# Patient Record
Sex: FEMALE | NOT HISPANIC OR LATINO | Employment: FULL TIME | URBAN - NONMETROPOLITAN AREA
[De-identification: names, ages, dates, MRNs, and addresses within clinical notes are randomized per-mention and may not be internally consistent; named-entity substitution may affect disease eponyms.]

---

## 2017-10-30 ENCOUNTER — TELEPHONE (OUTPATIENT)
Dept: ONCOLOGY | Facility: HOSPITAL | Age: 47
End: 2017-10-30

## 2017-10-30 NOTE — TELEPHONE ENCOUNTER
Patient seen Maci Sheridan NP at [facility] at [facility]--pt complains of loosing hair, Hgb 6.8.  Pt does not have family practice care established at this time.  No fatigue, no chest pain, no short of breath.  She is asking to refer this patient to you this week.   Joann Oviedo RN  October 30, 2017  2:43 PM

## 2017-10-30 NOTE — TELEPHONE ENCOUNTER
I will see her later this week.  If she starts having any chest pain or shortness of breath please ask her to come to the emergency room.  Thank you

## 2017-11-02 ENCOUNTER — CONSULT (OUTPATIENT)
Dept: ONCOLOGY | Facility: CLINIC | Age: 47
End: 2017-11-02

## 2017-11-02 ENCOUNTER — TELEPHONE (OUTPATIENT)
Dept: ONCOLOGY | Facility: CLINIC | Age: 47
End: 2017-11-02

## 2017-11-02 ENCOUNTER — APPOINTMENT (OUTPATIENT)
Dept: ONCOLOGY | Facility: HOSPITAL | Age: 47
End: 2017-11-02

## 2017-11-02 ENCOUNTER — LAB (OUTPATIENT)
Dept: ONCOLOGY | Facility: HOSPITAL | Age: 47
End: 2017-11-02

## 2017-11-02 VITALS
DIASTOLIC BLOOD PRESSURE: 75 MMHG | TEMPERATURE: 98.6 F | HEIGHT: 61 IN | BODY MASS INDEX: 28.05 KG/M2 | SYSTOLIC BLOOD PRESSURE: 145 MMHG | RESPIRATION RATE: 18 BRPM | HEART RATE: 89 BPM | WEIGHT: 148.59 LBS

## 2017-11-02 DIAGNOSIS — D50.9 MICROCYTIC ANEMIA: ICD-10-CM

## 2017-11-02 DIAGNOSIS — D75.839 THROMBOCYTOSIS: ICD-10-CM

## 2017-11-02 DIAGNOSIS — D50.9 MICROCYTIC ANEMIA: Primary | ICD-10-CM

## 2017-11-02 LAB
ANISOCYTOSIS BLD QL: NORMAL
BASOPHILS # BLD AUTO: 0.05 10*3/MM3 (ref 0–0.2)
BASOPHILS NFR BLD AUTO: 1 % (ref 0–2)
DEPRECATED RDW RBC AUTO: 38.3 FL (ref 36.4–46.3)
EOSINOPHIL # BLD AUTO: 0.04 10*3/MM3 (ref 0–0.7)
EOSINOPHIL NFR BLD AUTO: 0.8 % (ref 0–7)
ERYTHROCYTE [DISTWIDTH] IN BLOOD BY AUTOMATED COUNT: 17.9 % (ref 11.5–14.5)
FERRITIN SERPL-MCNC: 2.8 NG/ML (ref 6.2–137)
FOLATE SERPL-MCNC: 5.29 NG/ML (ref 2.76–21)
HCT VFR BLD AUTO: 27.8 % (ref 35–45)
HGB BLD-MCNC: 7.5 G/DL (ref 12–15.5)
IMM GRANULOCYTES # BLD: 0.01 10*3/MM3 (ref 0–0.02)
IMM GRANULOCYTES NFR BLD: 0.2 % (ref 0–0.5)
IRON 24H UR-MRATE: 11 MCG/DL (ref 37–170)
IRON SATN MFR SERPL: 2 % (ref 15–50)
LYMPHOCYTES # BLD AUTO: 1.78 10*3/MM3 (ref 0.6–4.2)
LYMPHOCYTES NFR BLD AUTO: 34 % (ref 10–50)
MCH RBC QN AUTO: 16.1 PG (ref 26.5–34)
MCHC RBC AUTO-ENTMCNC: 27 G/DL (ref 31.4–36)
MCV RBC AUTO: 59.5 FL (ref 80–98)
MICROCYTES BLD QL: NORMAL
MONOCYTES # BLD AUTO: 0.29 10*3/MM3 (ref 0–0.9)
MONOCYTES NFR BLD AUTO: 5.5 % (ref 0–12)
NEUTROPHILS # BLD AUTO: 3.07 10*3/MM3 (ref 2–8.6)
NEUTROPHILS NFR BLD AUTO: 58.5 % (ref 37–80)
NRBC BLD MANUAL-RTO: 0 /100 WBC (ref 0–0)
OVALOCYTES BLD QL SMEAR: NORMAL
PLATELET # BLD AUTO: 547 10*3/MM3 (ref 150–450)
PMV BLD AUTO: 8.5 FL (ref 8–12)
RBC # BLD AUTO: 4.67 10*6/MM3 (ref 3.77–5.16)
SMALL PLATELETS BLD QL SMEAR: NORMAL
TIBC SERPL-MCNC: 477 MCG/DL (ref 265–497)
VIT B12 BLD-MCNC: 516 PG/ML (ref 239–931)
WBC MORPH BLD: NORMAL
WBC NRBC COR # BLD: 5.24 10*3/MM3 (ref 3.2–9.8)

## 2017-11-02 PROCEDURE — G0463 HOSPITAL OUTPT CLINIC VISIT: HCPCS | Performed by: INTERNAL MEDICINE

## 2017-11-02 PROCEDURE — 85007 BL SMEAR W/DIFF WBC COUNT: CPT | Performed by: INTERNAL MEDICINE

## 2017-11-02 PROCEDURE — 85025 COMPLETE CBC W/AUTO DIFF WBC: CPT | Performed by: INTERNAL MEDICINE

## 2017-11-02 PROCEDURE — 82607 VITAMIN B-12: CPT | Performed by: INTERNAL MEDICINE

## 2017-11-02 PROCEDURE — 83550 IRON BINDING TEST: CPT | Performed by: INTERNAL MEDICINE

## 2017-11-02 PROCEDURE — 82728 ASSAY OF FERRITIN: CPT | Performed by: INTERNAL MEDICINE

## 2017-11-02 PROCEDURE — 82746 ASSAY OF FOLIC ACID SERUM: CPT | Performed by: INTERNAL MEDICINE

## 2017-11-02 PROCEDURE — 83540 ASSAY OF IRON: CPT | Performed by: INTERNAL MEDICINE

## 2017-11-02 PROCEDURE — 99203 OFFICE O/P NEW LOW 30 MIN: CPT | Performed by: INTERNAL MEDICINE

## 2017-11-02 RX ORDER — FERROUS SULFATE 325(65) MG
325 TABLET ORAL 2 TIMES DAILY WITH MEALS
Qty: 60 TABLET | Refills: 3 | Status: SHIPPED | OUTPATIENT
Start: 2017-11-02 | End: 2018-04-11 | Stop reason: SDUPTHER

## 2017-11-02 RX ORDER — LANOLIN ALCOHOL/MO/W.PET/CERES
1000 CREAM (GRAM) TOPICAL DAILY
Qty: 90 TABLET | Refills: 1 | Status: SHIPPED | OUTPATIENT
Start: 2017-11-02 | End: 2018-04-11 | Stop reason: SDUPTHER

## 2017-11-02 RX ORDER — DOCUSATE SODIUM 100 MG/1
100 CAPSULE, LIQUID FILLED ORAL 2 TIMES DAILY
Qty: 60 CAPSULE | Refills: 2 | Status: SHIPPED | OUTPATIENT
Start: 2017-11-02

## 2017-11-02 RX ORDER — LORATADINE 10 MG/1
10 TABLET ORAL
COMMUNITY
Start: 2017-10-30 | End: 2018-10-31

## 2017-11-02 RX ORDER — FOLIC ACID 1 MG/1
1 TABLET ORAL DAILY
Qty: 90 TABLET | Refills: 1 | Status: SHIPPED | OUTPATIENT
Start: 2017-11-02 | End: 2018-04-11 | Stop reason: SDUPTHER

## 2017-11-02 NOTE — PROGRESS NOTES
DATE OF CONSULT: 11/2/2017    REQUESTING SOURCE:        REASON FOR CONSULTATION:  Microcytic anemia      HISTORY OF PRESENT ILLNESS:    47-year-old female with a past medical history significant for arthritis affecting bilateral hand, was seen by STEPHANY Simpson at [facility] on October 30, 2017 with a complaint of hir fall.  Patient had a blood work done for evaluation and she was found to have microcytic anemia with MCV of 59 and hemoglobin of 6.8.  Patient complains of fatigue but denies any severe worsening recently.  Complains of heavy menstrual loss for last few months.  Denies any chest pain or any excessive shortness of breath with exertion.  States she had a infection in the right ear recently and had some dizziness with it.  Denies any recurrent dizziness since then.  Denies any blood in the stool or urine.  Denies any history of any blood transfusion or any family history of colon cancer.  Denies any chest pain or palpitation.      PAST MEDICAL HISTORY:    Past Medical History:   Diagnosis Date   • Anemia        PAST SURGICAL HISTORY:  No past surgical history on file.    ALLERGIES:    Allergies   Allergen Reactions   • Prednisone    • Sulfa Antibiotics        SOCIAL HISTORY:   Social History   Substance Use Topics   • Smoking status: Never Smoker   • Smokeless tobacco: Not on file   • Alcohol use Not on file       CURRENT MEDICATIONS:    Current Outpatient Prescriptions   Medication Sig Dispense Refill   • Cholecalciferol (VITAMIN D3) 5000 units capsule capsule Take 5,000 Units by mouth Daily.     • loratadine (CLARITIN) 10 MG tablet Take 10 mg by mouth.     • docusate sodium (COLACE) 100 MG capsule Take 1 capsule by mouth 2 (Two) Times a Day. 60 capsule 2   • ferrous sulfate 325 (65 FE) MG tablet Take 1 tablet by mouth 2 (Two) Times a Day With Meals. 60 tablet 3   • vitamin B-12 (CYANOCOBALAMIN) 1000 MCG tablet Take 1 tablet by mouth Daily. 90 tablet 1     No current facility-administered medications  for this visit.         HOME MEDICATIONS:   No current outpatient prescriptions on file prior to visit.     No current facility-administered medications on file prior to visit.        FAMILY HISTORY:    Family History   Problem Relation Age of Onset   • Heart disease Mother    • Heart disease Father    • Heart disease Sister    • Stroke Sister        REVIEW OF SYSTEMS:      CONSTITUTIONAL:  Complains of fatigue. Denies any fever, chills or weight loss.     HEENT:  No epistaxis, mouth sores or difficulty swallowing.    RESPIRATORY:  No new shortness of breath. No new cough or hemoptysis.    CARDIOVASCULAR:  No chest pain or palpitations.    GASTROINTESTINAL:  No abdominal pain nausea, vomiting or blood in the stool.    GENITOURINARY: No Dysuria or Hematuria.    MUSCULOSKELETAL: Complains of joint stiffness and arthritic pain in bilateral hand especially early in the morning.    LYMPHATICS:  Denies any abnormal swollen glands anywhere in the body.    NEUROLOGICAL :States she had dizziness with the year infection on right side which is resolved now.  No tingling or numbness. No headache . No seizures or balance problems.    SKIN: Complains of dry skin.        PHYSICAL EXAMINATION:      VITAL SIGNS:  Temp:  [98.6 °F (37 °C)] 98.6 °F (37 °C)  Heart Rate:  [89] 89  Resp:  [18] 18  BP: (145)/(75) 145/75    GENERAL:  Not in any distress.    HEENT:  Normocephalic, Atraumatic.Eyes  Shows mild pallor. No icterus. Extraocular Movements Intact. No Facial Asymmetry noted.    NECK:  No adenopathy. NO JVD.    RESPIRATORY:  Fair air entry bilateral. No rhonchi or wheezing.    CARDIOVASCULAR:  S1, S2. Regular rate and rhythm. No murmur or gallop appreciated.    ABDOMEN:  Soft, obese, nontender. Bowel sounds present in all four quadrants.  No organomegaly appreciated.    EXTREMITIES:  No edema.No Calf Tenderness.    NEUROLOGIC:  Alert, awake and oriented ×3.  No  Motor or sensory deficit appreciated. Cranial Nerves 2-12 grossly  intact.        DIAGNOSTIC DATA:    No results found for: WBC, RBC, HGB, HCT, MCV, MCH, MCHC, RDW, RDWSD, MPV, PLT, NEUTRORELPCT, LYMPHORELPCT, MONORELPCT, EOSRELPCT, BASORELPCT, AUTOIGPER, NEUTROABS, LYMPHSABS, MONOSABS, EOSABS, BASOSABS, AUTOIGNUM, NRBC  No results found for: GLUCOSE, NA, K, CO2, CL, ANIONGAP, CREATININE, BUN, BCR, CALCIUM, EGFRIFNONA, ALKPHOS, PROTEINTOT, ALT, AST, BILITOT, ALBUMIN, GLOB, LABIL2  No results found for: IRON, TIBC, LABIRON, FERRITIN, [id number], FOLATE  No results found for: , LABCA2, AFPTM, HCGQUANT, , CHROMGRNA, [id number], CEA, REFLABREPO]      ASSESSMENT AND PLAN:      1.  Microcytic anemia: Most likely secondary to wine deficiency from heavy menstrual loss.  We will check her stool for occult blood today.  We will check anemia workup today with iron studies, vitamin B12 and folate with repeat CBC today.  Patient denies any excessive symptoms from her anemia which may be because of she is having long-standing anemia that we do not know at this point.  Her last blood work was done many years ago.  Her hemoglobin was 6.8 on October 30, 2017 with MCV of 59 and RDW of 18.  We will recommend starting ferrous sulfate 1 tablet by mouth every day for next 7 days and after that if she does not have any severe stomach upset or constipation we'll increase it to one tablet twice daily.  If patient develops any stomach upset or cannot tolerate iron and then will do intravenous Feraheme which was discussed with patient.  Her vitamin B12 level was only 400 on October 30, recommend starting vitamin B12 thousand micrograms by mouth daily.  If her fecal occult blood comes back positive she will need to be seen by gastroenterology which was also discussed with patient.  Patient was instructed to call us if her fatigue gets any worse in that case we will do one unit of PRBC.    2.  Thrombocytosis: Most likely reactive secondary to iron deficiency.  We will monitored with CBC for  now.    3.  Health maintenance: Patient is not a smoker.  She is 47 years of age and never had a mammogram done, importance of getting screening mammograms done every year after age 40 was discussed with patient and her .  She is not due for screening colonoscopy at this point unless her stool comes back positive for blood that.  Checking today.  She remains full code.      Thank you for this consultation.          Abdias Fishman MD  11/2/2017  9:26 AM          EMR Dragon/Transcription disclaimer:   Much of this encounter note is an electronic transcription/translation of spoken language to printed text. The electronic translation of spoken language may permit erroneous, or at times, nonsensical words or phrases to be inadvertently transcribed; Although I have reviewed the note for such errors, some may still exist.

## 2017-11-02 NOTE — TELEPHONE ENCOUNTER
Called patient back and informed of lab results. Dr. Fishman also looked at cbc and said for patient to just take iron tablet and that no transfusion was needed at this time. Patient informed of this and she states understanding.

## 2017-11-03 ENCOUNTER — TELEPHONE (OUTPATIENT)
Dept: ONCOLOGY | Facility: HOSPITAL | Age: 47
End: 2017-11-03

## 2017-11-03 NOTE — TELEPHONE ENCOUNTER
----- Message from Abdias Fishman MD sent at 11/2/2017  4:23 PM CDT -----  I have sent prescription for folic acid 1 mg by mouth daily to her pharmacy.  Please let patient know.  Thank you

## 2017-11-03 NOTE — TELEPHONE ENCOUNTER
Called patient and informed that prescription for Folic Acid sent to her pharmacy. Patient states understanding.

## 2017-11-13 LAB — HEMOCCULT STL QL IA: NEGATIVE

## 2017-11-13 PROCEDURE — 82274 ASSAY TEST FOR BLOOD FECAL: CPT | Performed by: INTERNAL MEDICINE

## 2018-01-02 ENCOUNTER — LAB (OUTPATIENT)
Dept: ONCOLOGY | Facility: HOSPITAL | Age: 48
End: 2018-01-02

## 2018-01-02 DIAGNOSIS — D50.9 MICROCYTIC ANEMIA: ICD-10-CM

## 2018-01-02 LAB
ALBUMIN SERPL-MCNC: 4.4 G/DL (ref 3.4–4.8)
ALBUMIN/GLOB SERPL: 1.1 G/DL (ref 1.1–1.8)
ALP SERPL-CCNC: 80 U/L (ref 38–126)
ALT SERPL W P-5'-P-CCNC: 35 U/L (ref 9–52)
ANION GAP SERPL CALCULATED.3IONS-SCNC: 10 MMOL/L (ref 5–15)
ANISOCYTOSIS BLD QL: NORMAL
AST SERPL-CCNC: 26 U/L (ref 14–36)
BASOPHILS # BLD AUTO: 0.03 10*3/MM3 (ref 0–0.2)
BASOPHILS NFR BLD AUTO: 0.3 % (ref 0–2)
BILIRUB SERPL-MCNC: 0.2 MG/DL (ref 0.2–1.3)
BUN BLD-MCNC: 8 MG/DL (ref 7–21)
BUN/CREAT SERPL: 13.8 (ref 7–25)
CALCIUM SPEC-SCNC: 9 MG/DL (ref 8.4–10.2)
CHLORIDE SERPL-SCNC: 101 MMOL/L (ref 95–110)
CO2 SERPL-SCNC: 25 MMOL/L (ref 22–31)
CREAT BLD-MCNC: 0.58 MG/DL (ref 0.5–1)
DEPRECATED RDW RBC AUTO: ABNORMAL FL (ref 36.4–46.3)
EOSINOPHIL # BLD AUTO: 0.1 10*3/MM3 (ref 0–0.7)
EOSINOPHIL NFR BLD AUTO: 1.1 % (ref 0–7)
ERYTHROCYTE [DISTWIDTH] IN BLOOD BY AUTOMATED COUNT: ABNORMAL % (ref 11.5–14.5)
FERRITIN SERPL-MCNC: 16.7 NG/ML (ref 6.2–137)
FOLATE SERPL-MCNC: >20 NG/ML (ref 2.76–21)
GFR SERPL CREATININE-BSD FRML MDRD: 111 ML/MIN/1.73 (ref 58–135)
GFR SERPL CREATININE-BSD FRML MDRD: 135 ML/MIN/1.73 (ref 58–135)
GLOBULIN UR ELPH-MCNC: 4 GM/DL (ref 2.3–3.5)
GLUCOSE BLD-MCNC: 91 MG/DL (ref 60–100)
HCT VFR BLD AUTO: 40.9 % (ref 35–45)
HGB BLD-MCNC: 13.6 G/DL (ref 12–15.5)
HYPOCHROMIA BLD QL: NORMAL
IMM GRANULOCYTES # BLD: 0.01 10*3/MM3 (ref 0–0.02)
IMM GRANULOCYTES NFR BLD: 0.1 % (ref 0–0.5)
IRON 24H UR-MRATE: 162 MCG/DL (ref 37–170)
IRON SATN MFR SERPL: 46 % (ref 15–50)
LARGE PLATELETS: NORMAL
LYMPHOCYTES # BLD AUTO: 2.4 10*3/MM3 (ref 0.6–4.2)
LYMPHOCYTES NFR BLD AUTO: 27.3 % (ref 10–50)
MCH RBC QN AUTO: 25.8 PG (ref 26.5–34)
MCHC RBC AUTO-ENTMCNC: 33.3 G/DL (ref 31.4–36)
MCV RBC AUTO: 77.6 FL (ref 80–98)
MICROCYTES BLD QL: NORMAL
MONOCYTES # BLD AUTO: 0.55 10*3/MM3 (ref 0–0.9)
MONOCYTES NFR BLD AUTO: 6.3 % (ref 0–12)
NEUTROPHILS # BLD AUTO: 5.7 10*3/MM3 (ref 2–8.6)
NEUTROPHILS NFR BLD AUTO: 64.9 % (ref 37–80)
NRBC BLD MANUAL-RTO: 0 /100 WBC (ref 0–0)
OVALOCYTES BLD QL SMEAR: NORMAL
PLATELET # BLD AUTO: 289 10*3/MM3 (ref 150–450)
PMV BLD AUTO: 9.2 FL (ref 8–12)
POTASSIUM BLD-SCNC: 4 MMOL/L (ref 3.5–5.1)
PROT SERPL-MCNC: 8.4 G/DL (ref 6.3–8.6)
RBC # BLD AUTO: 5.27 10*6/MM3 (ref 3.77–5.16)
SMALL PLATELETS BLD QL SMEAR: ADEQUATE
SODIUM BLD-SCNC: 136 MMOL/L (ref 137–145)
TARGETS BLD QL SMEAR: NORMAL
TIBC SERPL-MCNC: 355 MCG/DL (ref 265–497)
VIT B12 BLD-MCNC: 969 PG/ML (ref 239–931)
WBC MORPH BLD: NORMAL
WBC NRBC COR # BLD: 8.79 10*3/MM3 (ref 3.2–9.8)

## 2018-01-02 PROCEDURE — 82746 ASSAY OF FOLIC ACID SERUM: CPT

## 2018-01-02 PROCEDURE — 85007 BL SMEAR W/DIFF WBC COUNT: CPT

## 2018-01-02 PROCEDURE — 85025 COMPLETE CBC W/AUTO DIFF WBC: CPT

## 2018-01-02 PROCEDURE — 82607 VITAMIN B-12: CPT

## 2018-01-02 PROCEDURE — 36415 COLL VENOUS BLD VENIPUNCTURE: CPT | Performed by: NURSE PRACTITIONER

## 2018-01-02 PROCEDURE — 82728 ASSAY OF FERRITIN: CPT

## 2018-01-02 PROCEDURE — 83540 ASSAY OF IRON: CPT

## 2018-01-02 PROCEDURE — 83550 IRON BINDING TEST: CPT

## 2018-01-02 PROCEDURE — 80053 COMPREHEN METABOLIC PANEL: CPT

## 2018-01-03 ENCOUNTER — OFFICE VISIT (OUTPATIENT)
Dept: ONCOLOGY | Facility: CLINIC | Age: 48
End: 2018-01-03

## 2018-01-03 VITALS
HEART RATE: 90 BPM | BODY MASS INDEX: 30.01 KG/M2 | RESPIRATION RATE: 18 BRPM | WEIGHT: 158.95 LBS | SYSTOLIC BLOOD PRESSURE: 141 MMHG | TEMPERATURE: 98.3 F | HEIGHT: 61 IN | DIASTOLIC BLOOD PRESSURE: 89 MMHG

## 2018-01-03 DIAGNOSIS — D50.0 IRON DEFICIENCY ANEMIA DUE TO CHRONIC BLOOD LOSS: Primary | ICD-10-CM

## 2018-01-03 PROCEDURE — 99214 OFFICE O/P EST MOD 30 MIN: CPT | Performed by: NURSE PRACTITIONER

## 2018-01-03 PROCEDURE — G0463 HOSPITAL OUTPT CLINIC VISIT: HCPCS | Performed by: NURSE PRACTITIONER

## 2018-01-03 NOTE — PROGRESS NOTES
DATE OF VISIT: 1/3/2018    REASON FOR VISIT:  Follow-up for iron deficiency anemia    HISTORY OF PRESENT ILLNESS:  47-year-old female with a past medical history significant for arthritis affecting bilateral hand, was seen by STEPHANY Simpson at [facility] on October 30, 2017 with a complaint of hir fall.  Patient had a blood work done for evaluation and she was found to have microcytic anemia with MCV of 59 and hemoglobin of 6.8.  Patient complains of fatigue but denies any severe worsening recently.  Complains of heavy menstrual loss for last few months.  Denies any chest pain or any excessive shortness of breath with exertion.   Denies any blood in the stool or urine.  Denies any history of any blood transfusion or any family history of colon cancer.  Denies any chest pain or palpitation.    She was seen by Dr. Fishman and found to be severe iron deficient, along with low B-12 and folate level. She was started on supplementation and is back today to reassess her iron levels. She is taking ferrous sulfate BID and tolerating very well.        PAST MEDICAL HISTORY:    Past Medical History:   Diagnosis Date   • Anemia        SOCIAL HISTORY:    Social History   Substance Use Topics   • Smoking status: Never Smoker   • Smokeless tobacco: None   • Alcohol use None       Surgical History :  No past surgical history on file.    ALLERGIES:    Allergies   Allergen Reactions   • Prednisone    • Sulfa Antibiotics        REVIEW OF SYSTEMS:      CONSTITUTIONAL:  No fever, chills, or night sweats.     HEENT:  No epistaxis, mouth sores, or difficulty swallowing.    RESPIRATORY:  No new shortness of breath or cough at present.    CARDIOVASCULAR:  No chest pain or palpitations.    GASTROINTESTINAL:  No abdominal pain, nausea, vomiting, or blood in the stool.    GENITOURINARY:  No dysuria or hematuria.    MUSCULOSKELETAL:  No any new back pain or arthralgias.     NEUROLOGICAL:  No tingling or numbness. No new headache or dizziness.  "    LYMPHATICS:  Denies any abnormal swollen and anywhere in the body.    SKIN:  Denies any new skin rash.    PHYSICAL EXAMINATION:      VITAL SIGNS:  /89  Pulse 90  Temp 98.3 °F (36.8 °C) (Temporal Artery )   Resp 18  Ht 154.9 cm (60.98\")  Wt 72.1 kg (158 lb 15.2 oz)  BMI 30.05 kg/m2    GENERAL:  Not in any distress.    HEENT:  Normocephalic, Atraumatic.Mild Conjunctival pallor. No icterus.  No Facial Asymmetry noted.    NECK:  No adenopathy. No JVD.    RESPIRATORY:  Fair air entry bilateral. No rhonchi or wheezing.    CARDIOVASCULAR:  S1, S2. Regular rate and rhythm. No murmur or gallop appreciated.    ABDOMEN:  Soft, obese, nontender. Bowel sounds present in all four quadrants.  No organomegaly appreciated.    EXTREMITIES:  No edema.No Calf Tenderness.    NEUROLOGIC:  Alert, awake and oriented ×3.    SKIN : No new skin lesion identified  DIAGNOSTIC DATA:    Glucose   Date Value Ref Range Status   01/02/2018 91 60 - 100 mg/dL Final     Sodium   Date Value Ref Range Status   01/02/2018 136 (L) 137 - 145 mmol/L Final     Potassium   Date Value Ref Range Status   01/02/2018 4.0 3.5 - 5.1 mmol/L Final     CO2   Date Value Ref Range Status   01/02/2018 25.0 22.0 - 31.0 mmol/L Final     Chloride   Date Value Ref Range Status   01/02/2018 101 95 - 110 mmol/L Final     Anion Gap   Date Value Ref Range Status   01/02/2018 10.0 5.0 - 15.0 mmol/L Final     Creatinine   Date Value Ref Range Status   01/02/2018 0.58 0.50 - 1.00 mg/dL Final     BUN   Date Value Ref Range Status   01/02/2018 8 7 - 21 mg/dL Final     BUN/Creatinine Ratio   Date Value Ref Range Status   01/02/2018 13.8 7.0 - 25.0 Final     Calcium   Date Value Ref Range Status   01/02/2018 9.0 8.4 - 10.2 mg/dL Final     eGFR Non  Amer   Date Value Ref Range Status   01/02/2018 111 58 - 135 mL/min/1.73 Final     Alkaline Phosphatase   Date Value Ref Range Status   01/02/2018 80 38 - 126 U/L Final     Total Protein   Date Value Ref Range Status "   01/02/2018 8.4 6.3 - 8.6 g/dL Final     ALT (SGPT)   Date Value Ref Range Status   01/02/2018 35 9 - 52 U/L Final     AST (SGOT)   Date Value Ref Range Status   01/02/2018 26 14 - 36 U/L Final     Total Bilirubin   Date Value Ref Range Status   01/02/2018 0.2 0.2 - 1.3 mg/dL Final     Albumin   Date Value Ref Range Status   01/02/2018 4.40 3.40 - 4.80 g/dL Final     Globulin   Date Value Ref Range Status   01/02/2018 4.0 (H) 2.3 - 3.5 gm/dL Final     A/G Ratio   Date Value Ref Range Status   01/02/2018 1.1 1.1 - 1.8 g/dL Final     Lab Results   Component Value Date    WBC 8.79 01/02/2018    HGB 13.6 01/02/2018    HCT 40.9 01/02/2018    MCV 77.6 (L) 01/02/2018     01/02/2018     Lab Results   Component Value Date    NEUTROABS 5.70 01/02/2018    IRON 162 01/02/2018    TIBC 355 01/02/2018    LABIRON 46 01/02/2018    FERRITIN 16.70 01/02/2018    [id number] 969 (H) 01/02/2018    FOLATE >20.00 01/02/2018     No results found for: , LABCA2, AFPTM, HCGQUANT, , CHROMGRNA, [id number], CEA, REFLABREPO]        ASSESSMENT AND PLAN:      1. Iron deficiency anemia secondary to heavy menstrual blood loss; her Hgb has improved to 13.6 and her iron profile and ferritin are improving as well, will have her continue taking ferrous sulfate BID since she is tolerating well and will recheck her labs again in 3 mos.    2. Thrombocytosis, thought to be secondary to problem #1 has resolved.    3. Health maintenance, she does not smoke and at age 47 she is not due for screening colonoscopy.       This document has been signed by STEPHANY Louis on January 3, 2018 3:58 PM

## 2018-04-04 ENCOUNTER — APPOINTMENT (OUTPATIENT)
Dept: ONCOLOGY | Facility: HOSPITAL | Age: 48
End: 2018-04-04

## 2018-04-10 ENCOUNTER — LAB (OUTPATIENT)
Dept: LAB | Facility: CLINIC | Age: 48
End: 2018-04-10

## 2018-04-10 ENCOUNTER — APPOINTMENT (OUTPATIENT)
Dept: ONCOLOGY | Facility: HOSPITAL | Age: 48
End: 2018-04-10

## 2018-04-10 DIAGNOSIS — D50.0 IRON DEFICIENCY ANEMIA DUE TO CHRONIC BLOOD LOSS: ICD-10-CM

## 2018-04-11 ENCOUNTER — OFFICE VISIT (OUTPATIENT)
Dept: ONCOLOGY | Facility: CLINIC | Age: 48
End: 2018-04-11

## 2018-04-11 ENCOUNTER — TELEPHONE (OUTPATIENT)
Dept: ONCOLOGY | Facility: HOSPITAL | Age: 48
End: 2018-04-11

## 2018-04-11 VITALS
TEMPERATURE: 98.4 F | SYSTOLIC BLOOD PRESSURE: 154 MMHG | BODY MASS INDEX: 31.75 KG/M2 | HEIGHT: 61 IN | HEART RATE: 84 BPM | WEIGHT: 168.2 LBS | RESPIRATION RATE: 18 BRPM | DIASTOLIC BLOOD PRESSURE: 82 MMHG

## 2018-04-11 DIAGNOSIS — D50.0 IRON DEFICIENCY ANEMIA DUE TO CHRONIC BLOOD LOSS: Primary | ICD-10-CM

## 2018-04-11 LAB
BASOPHILS # BLD AUTO: 0.05 10*3/MM3 (ref 0–0.2)
BASOPHILS NFR BLD AUTO: 0.7 % (ref 0–2)
DEPRECATED RDW RBC AUTO: 42.1 FL (ref 36.4–46.3)
EOSINOPHIL # BLD AUTO: 0.16 10*3/MM3 (ref 0–0.7)
EOSINOPHIL NFR BLD AUTO: 2.1 % (ref 0–7)
ERYTHROCYTE [DISTWIDTH] IN BLOOD BY AUTOMATED COUNT: 13 % (ref 11.5–14.5)
FERRITIN SERPL-MCNC: 46.8 NG/ML (ref 6.2–137)
FOLATE SERPL-MCNC: >20 NG/ML (ref 2.76–21)
HCT VFR BLD AUTO: 41.1 % (ref 35–45)
HGB BLD-MCNC: 14.1 G/DL (ref 12–15.5)
IMM GRANULOCYTES # BLD: 0.02 10*3/MM3 (ref 0–0.02)
IMM GRANULOCYTES NFR BLD: 0.3 % (ref 0–0.5)
IRON 24H UR-MRATE: 134 MCG/DL (ref 37–170)
IRON SATN MFR SERPL: 43 % (ref 15–50)
LYMPHOCYTES # BLD AUTO: 2.33 10*3/MM3 (ref 0.6–4.2)
LYMPHOCYTES NFR BLD AUTO: 30.8 % (ref 10–50)
MCH RBC QN AUTO: 30.6 PG (ref 26.5–34)
MCHC RBC AUTO-ENTMCNC: 34.3 G/DL (ref 31.4–36)
MCV RBC AUTO: 89.2 FL (ref 80–98)
MONOCYTES # BLD AUTO: 0.49 10*3/MM3 (ref 0–0.9)
MONOCYTES NFR BLD AUTO: 6.5 % (ref 0–12)
NEUTROPHILS # BLD AUTO: 4.52 10*3/MM3 (ref 2–8.6)
NEUTROPHILS NFR BLD AUTO: 59.6 % (ref 37–80)
PLATELET # BLD AUTO: 298 10*3/MM3 (ref 150–450)
PMV BLD AUTO: 11.7 FL (ref 8–12)
RBC # BLD AUTO: 4.61 10*6/MM3 (ref 3.77–5.16)
TIBC SERPL-MCNC: 315 MCG/DL (ref 265–497)
VIT B12 BLD-MCNC: 904 PG/ML (ref 239–931)
WBC NRBC COR # BLD: 7.57 10*3/MM3 (ref 3.2–9.8)

## 2018-04-11 PROCEDURE — 82746 ASSAY OF FOLIC ACID SERUM: CPT | Performed by: NURSE PRACTITIONER

## 2018-04-11 PROCEDURE — 83540 ASSAY OF IRON: CPT | Performed by: NURSE PRACTITIONER

## 2018-04-11 PROCEDURE — 82607 VITAMIN B-12: CPT | Performed by: NURSE PRACTITIONER

## 2018-04-11 PROCEDURE — G0463 HOSPITAL OUTPT CLINIC VISIT: HCPCS | Performed by: NURSE PRACTITIONER

## 2018-04-11 PROCEDURE — 99214 OFFICE O/P EST MOD 30 MIN: CPT | Performed by: NURSE PRACTITIONER

## 2018-04-11 PROCEDURE — 83550 IRON BINDING TEST: CPT | Performed by: NURSE PRACTITIONER

## 2018-04-11 PROCEDURE — 82728 ASSAY OF FERRITIN: CPT | Performed by: NURSE PRACTITIONER

## 2018-04-11 PROCEDURE — 85025 COMPLETE CBC W/AUTO DIFF WBC: CPT | Performed by: NURSE PRACTITIONER

## 2018-04-11 PROCEDURE — 36415 COLL VENOUS BLD VENIPUNCTURE: CPT | Performed by: FAMILY MEDICINE

## 2018-04-11 RX ORDER — FERROUS SULFATE 325(65) MG
325 TABLET ORAL 2 TIMES DAILY WITH MEALS
Qty: 60 TABLET | Refills: 3 | Status: SHIPPED | OUTPATIENT
Start: 2018-04-11

## 2018-04-11 RX ORDER — LANOLIN ALCOHOL/MO/W.PET/CERES
1000 CREAM (GRAM) TOPICAL DAILY
Qty: 90 TABLET | Refills: 1 | Status: SHIPPED | OUTPATIENT
Start: 2018-04-11

## 2018-04-11 RX ORDER — FOLIC ACID 1 MG/1
1 TABLET ORAL DAILY
Qty: 90 TABLET | Refills: 1 | Status: SHIPPED | OUTPATIENT
Start: 2018-04-11 | End: 2018-09-13 | Stop reason: SDUPTHER

## 2018-04-11 NOTE — PROGRESS NOTES
"DATE OF VISIT: 4/11/2018    REASON FOR VISIT:   Follow-up for iron deficiency anemia     HISTORY OF PRESENT ILLNESS:  48-year-old female with a past medical history significant for arthritis affecting bilateral hand, was seen by STEPHANY Simpson at [facility] on October 30, 2017 and was found to have a severe iron deficiency anemia, Hgb wa 6.8 with MCV of 59 and ferritin was 2.8. She was seen here in consultation and was noted to have heavy menstrual periods. B-12 and folate levels were low as well. She was started on oral iron BID along with oral B-12 and folic acid. Her labs in January were improving. She is here today for follow-up appt.      PAST MEDICAL HISTORY:    Past Medical History:   Diagnosis Date   • Anemia        SOCIAL HISTORY:    Social History   Substance Use Topics   • Smoking status: Never Smoker   • Smokeless tobacco: Not on file   • Alcohol use Not on file       Surgical History :  No past surgical history on file.    ALLERGIES:    Allergies   Allergen Reactions   • Prednisone    • Sulfa Antibiotics        REVIEW OF SYSTEMS:      CONSTITUTIONAL:  No fever, chills, or night sweats.     HEENT:  No epistaxis, mouth sores, or difficulty swallowing.    RESPIRATORY:  No new shortness of breath or cough at present.    CARDIOVASCULAR:  No chest pain or palpitations.    GASTROINTESTINAL:  No abdominal pain, nausea, vomiting, or blood in the stool.    GENITOURINARY:  No dysuria or hematuria.    MUSCULOSKELETAL:  No any new back pain or arthralgias.     NEUROLOGICAL:  No tingling or numbness. No new headache or dizziness.     LYMPHATICS:  Denies any abnormal swollen and anywhere in the body.    SKIN:  Denies any new skin rash.    PHYSICAL EXAMINATION:      VITAL SIGNS:  /82   Pulse 84   Temp 98.4 °F (36.9 °C) (Temporal Artery )   Resp 18   Ht 154.9 cm (60.98\")   Wt 76.3 kg (168 lb 3.2 oz)   BMI 31.80 kg/m²     GENERAL:  Not in any distress.    HEENT:  Normocephalic, Atraumatic.Mild Conjunctival " pallor. No icterus. No Facial Asymmetry noted.    NECK:  No adenopathy. No JVD.    RESPIRATORY:  Fair air entry bilateral. No rhonchi or wheezing.    CARDIOVASCULAR:  S1, S2. Regular rate and rhythm. No murmur or gallop appreciated.    ABDOMEN:  Soft, obese, nontender. Bowel sounds present in all four quadrants.  No organomegaly appreciated.    EXTREMITIES:  No edema.No Calf Tenderness.    NEUROLOGIC:  Alert, awake and oriented ×3.      SKIN : No new skin lesion identified  DIAGNOSTIC DATA:    Glucose   Date Value Ref Range Status   01/02/2018 91 60 - 100 mg/dL Final     Sodium   Date Value Ref Range Status   01/02/2018 136 (L) 137 - 145 mmol/L Final     Potassium   Date Value Ref Range Status   01/02/2018 4.0 3.5 - 5.1 mmol/L Final     CO2   Date Value Ref Range Status   01/02/2018 25.0 22.0 - 31.0 mmol/L Final     Chloride   Date Value Ref Range Status   01/02/2018 101 95 - 110 mmol/L Final     Anion Gap   Date Value Ref Range Status   01/02/2018 10.0 5.0 - 15.0 mmol/L Final     Creatinine   Date Value Ref Range Status   01/02/2018 0.58 0.50 - 1.00 mg/dL Final     BUN   Date Value Ref Range Status   01/02/2018 8 7 - 21 mg/dL Final     BUN/Creatinine Ratio   Date Value Ref Range Status   01/02/2018 13.8 7.0 - 25.0 Final     Calcium   Date Value Ref Range Status   01/02/2018 9.0 8.4 - 10.2 mg/dL Final     eGFR Non  Amer   Date Value Ref Range Status   01/02/2018 111 58 - 135 mL/min/1.73 Final     Alkaline Phosphatase   Date Value Ref Range Status   01/02/2018 80 38 - 126 U/L Final     Total Protein   Date Value Ref Range Status   01/02/2018 8.4 6.3 - 8.6 g/dL Final     ALT (SGPT)   Date Value Ref Range Status   01/02/2018 35 9 - 52 U/L Final     AST (SGOT)   Date Value Ref Range Status   01/02/2018 26 14 - 36 U/L Final     Total Bilirubin   Date Value Ref Range Status   01/02/2018 0.2 0.2 - 1.3 mg/dL Final     Albumin   Date Value Ref Range Status   01/02/2018 4.40 3.40 - 4.80 g/dL Final     Globulin   Date  Value Ref Range Status   01/02/2018 4.0 (H) 2.3 - 3.5 gm/dL Final     A/G Ratio   Date Value Ref Range Status   01/02/2018 1.1 1.1 - 1.8 g/dL Final     Lab Results   Component Value Date    WBC 7.57 04/11/2018    HGB 14.1 04/11/2018    HCT 41.1 04/11/2018    MCV 89.2 04/11/2018     04/11/2018     Lab Results   Component Value Date    NEUTROABS 4.52 04/11/2018    IRON 134 04/11/2018    TIBC 315 04/11/2018    LABIRON 43 04/11/2018    FERRITIN 16.70 01/02/2018    [id number] 969 (H) 01/02/2018    FOLATE >20.00 01/02/2018     No results found for: , LABCA2, AFPTM, HCGQUANT, , CHROMGRNA, [id number], CEA, REFLABREPO]    ASSESSMENT AND PLAN:      1. Iron deficiency anemia secondary to heavy menstrual blood loss; her Hgb has improved to 14.1; iron studies are still pending along with B-12 and folate levels; will have her continue her medications and once labs are back and reviewed if any changes are neccesary will contact pt; new prescriptions sent to her pharmacy. Will recheck her labs again in 4 mos.     2. Thrombocytosis, thought to be secondary to problem #1 has resolved.     3. Health maintenance, she does not smoke and at age 47 she is not due for screening colonoscopy. She has not had a PAP smear or mammogram, recommended that she go back and see Maci Sheridan NP at [facility] and discuss having these screening procedures ordered.       This document has been signed by STEPHANY Louis on April 11, 2018 2:30 PM

## 2018-04-11 NOTE — TELEPHONE ENCOUNTER
----- Message from STEPHANY Louis sent at 4/11/2018  3:32 PM CDT -----  Please call patient and tell her to cut back to one iron tab daily and to continue with B-12 and folic acid    Thanks  Eleonora

## 2018-09-11 ENCOUNTER — LAB (OUTPATIENT)
Dept: LAB | Facility: HOSPITAL | Age: 48
End: 2018-09-11

## 2018-09-11 DIAGNOSIS — D50.0 IRON DEFICIENCY ANEMIA DUE TO CHRONIC BLOOD LOSS: ICD-10-CM

## 2018-09-11 PROCEDURE — 83550 IRON BINDING TEST: CPT | Performed by: NURSE PRACTITIONER

## 2018-09-11 PROCEDURE — 85025 COMPLETE CBC W/AUTO DIFF WBC: CPT | Performed by: NURSE PRACTITIONER

## 2018-09-11 PROCEDURE — 83540 ASSAY OF IRON: CPT | Performed by: NURSE PRACTITIONER

## 2018-09-11 PROCEDURE — 82607 VITAMIN B-12: CPT | Performed by: NURSE PRACTITIONER

## 2018-09-11 PROCEDURE — 82746 ASSAY OF FOLIC ACID SERUM: CPT | Performed by: NURSE PRACTITIONER

## 2018-09-11 PROCEDURE — 82728 ASSAY OF FERRITIN: CPT | Performed by: NURSE PRACTITIONER

## 2018-09-12 LAB
BASOPHILS # BLD AUTO: 0.05 10*3/MM3 (ref 0–0.2)
BASOPHILS NFR BLD AUTO: 0.6 % (ref 0–2)
DEPRECATED RDW RBC AUTO: 41.1 FL (ref 36.4–46.3)
EOSINOPHIL # BLD AUTO: 0.17 10*3/MM3 (ref 0–0.7)
EOSINOPHIL NFR BLD AUTO: 2.1 % (ref 0–7)
ERYTHROCYTE [DISTWIDTH] IN BLOOD BY AUTOMATED COUNT: 12.6 % (ref 11.5–14.5)
FERRITIN SERPL-MCNC: 41 NG/ML (ref 6.2–137)
FOLATE SERPL-MCNC: >20 NG/ML (ref 2.76–21)
HCT VFR BLD AUTO: 41.4 % (ref 35–45)
HGB BLD-MCNC: 14 G/DL (ref 12–15.5)
IMM GRANULOCYTES # BLD: 0.01 10*3/MM3 (ref 0–0.02)
IMM GRANULOCYTES NFR BLD: 0.1 % (ref 0–0.5)
IRON 24H UR-MRATE: 131 MCG/DL (ref 37–170)
IRON SATN MFR SERPL: 42 % (ref 15–50)
LYMPHOCYTES # BLD AUTO: 2.86 10*3/MM3 (ref 0.6–4.2)
LYMPHOCYTES NFR BLD AUTO: 34.7 % (ref 10–50)
MCH RBC QN AUTO: 30.3 PG (ref 26.5–34)
MCHC RBC AUTO-ENTMCNC: 33.8 G/DL (ref 31.4–36)
MCV RBC AUTO: 89.6 FL (ref 80–98)
MONOCYTES # BLD AUTO: 0.42 10*3/MM3 (ref 0–0.9)
MONOCYTES NFR BLD AUTO: 5.1 % (ref 0–12)
NEUTROPHILS # BLD AUTO: 4.74 10*3/MM3 (ref 2–8.6)
NEUTROPHILS NFR BLD AUTO: 57.4 % (ref 37–80)
PLATELET # BLD AUTO: 283 10*3/MM3 (ref 150–450)
PMV BLD AUTO: 11.5 FL (ref 8–12)
RBC # BLD AUTO: 4.62 10*6/MM3 (ref 3.77–5.16)
TIBC SERPL-MCNC: 309 MCG/DL (ref 265–497)
VIT B12 BLD-MCNC: 940 PG/ML (ref 239–931)
WBC NRBC COR # BLD: 8.25 10*3/MM3 (ref 3.2–9.8)

## 2018-09-13 ENCOUNTER — TELEPHONE (OUTPATIENT)
Dept: ONCOLOGY | Facility: HOSPITAL | Age: 48
End: 2018-09-13

## 2018-09-13 ENCOUNTER — TELEPHONE (OUTPATIENT)
Dept: ONCOLOGY | Facility: CLINIC | Age: 48
End: 2018-09-13

## 2018-09-13 RX ORDER — FOLIC ACID 1 MG/1
1 TABLET ORAL DAILY
Qty: 90 TABLET | Refills: 1 | Status: SHIPPED | OUTPATIENT
Start: 2018-09-13

## 2018-09-13 NOTE — PROGRESS NOTES
Call pt and let her know labs are okay and for her to continue with her same medications. She will need to be seen in 4 mos with same labs    Thanks  Eleonora

## 2018-09-13 NOTE — TELEPHONE ENCOUNTER
----- Message from STEPHANY Louis sent at 9/13/2018  1:41 PM CDT -----  Call pt and let her know labs are okay and for her to continue with her same medications. She will need to be seen in 4 mos with same labs    Thanks  Eleonora

## 2019-01-15 DIAGNOSIS — D75.839 THROMBOCYTOSIS: ICD-10-CM

## 2019-01-15 DIAGNOSIS — D50.9 MICROCYTIC ANEMIA: Primary | ICD-10-CM

## 2019-01-16 ENCOUNTER — TELEPHONE (OUTPATIENT)
Dept: ONCOLOGY | Facility: CLINIC | Age: 49
End: 2019-01-16

## 2019-01-17 ENCOUNTER — APPOINTMENT (OUTPATIENT)
Dept: ONCOLOGY | Facility: CLINIC | Age: 49
End: 2019-01-17

## 2019-01-17 NOTE — TELEPHONE ENCOUNTER
Called patient. She wanted to know if she could get her labs drawn and then have Eleonora call her with results. I told her that we did that back in September so this time she needs to come in and be seen. Patient states understanding of that. Transferred call to PAR team to reschedule.